# Patient Record
Sex: MALE | Race: WHITE | Employment: FULL TIME | URBAN - METROPOLITAN AREA
[De-identification: names, ages, dates, MRNs, and addresses within clinical notes are randomized per-mention and may not be internally consistent; named-entity substitution may affect disease eponyms.]

---

## 2018-11-14 NOTE — PROGRESS NOTES
[facility]  [address] [facility] [id number] [phone]  Dept: [phone]    PRE-OP EVALUATION:  Today's date: 11/15/2018    Demario Bell (: 1985) presents for pre-operative evaluation assessment as requested by Dr. Chowdhury.  He requires evaluation and anesthesia risk assessment prior to undergoing surgery/procedure for treatment of kidney stone removal .    Fax number for surgical facility:   Primary Physician: No Ref-Primary, Physician  Type of Anesthesia Anticipated: General    Patient has a Health Care Directive or Living Will:  NO    Preop Questions 11/15/2018   Who is doing your surgery? Dr Chowdhury   What are you having done? Kidney stone removal   Date of Surgery/Procedure: 2018   Facility or Hospital where procedure/surgery will be performed: [facility]   1.  Do you have a history of Heart attack, stroke, stent, coronary bypass surgery, or other heart surgery? No   2.  Do you ever have any pain or discomfort in your chest? No   3.  Do you have a history of  Heart Failure? No   4.   Are you troubled by shortness of breath when:  walking on a level surface, or up a slight hill, or at night? No   5.  Do you currently have a cold, bronchitis or other respiratory infection? YES - about 1 wk with sinus congestion, no cough or fevers.    6.  Do you have a cough, shortness of breath, or wheezing? No   7.  Do you sometimes get pains in the calves of your legs when you walk? No   8. Do you or anyone in your family have previous history of blood clots? No   9.  Do you or does anyone in your family have a serious bleeding problem such as prolonged bleeding following surgeries or cuts? No   10. Have you ever had problems with anemia or been told to take iron pills? No   11. Have you had any abnormal blood loss such as black, tarry or bloody stools? No   12. Have you ever had a blood transfusion? No   13. Have you or any of your relatives ever had problems with  anesthesia? No   14. Do you have sleep apnea, excessive snoring or daytime drowsiness? YES - uses CPAP   15. Do you have any prosthetic heart valves? No   16. Do you have prosthetic joints? No         HPI:     HPI related to upcoming procedure: history of kidney stones.       See problem list for active medical problems.  Problems all longstanding and stable, except as noted/documented.  See ROS for pertinent symptoms related to these conditions.                                                                                                                                                          .    MEDICAL HISTORY:     Patient Active Problem List    Diagnosis Date Noted     Kidney stone 11/15/2018     Priority: Medium     BRODIE (obstructive sleep apnea) 11/15/2018     Priority: Medium      Past Medical History:   Diagnosis Date     Kidney stones      Sleep apnea      Past Surgical History:   Procedure Laterality Date     AS REMOVAL OF KIDNEY STONE       No current outpatient prescriptions on file.     OTC products: None, except as noted above    No Known Allergies   Latex Allergy: NO    Social History   Substance Use Topics     Smoking status: Never Smoker     Smokeless tobacco: Never Used     Alcohol use No     History   Drug Use No       REVIEW OF SYSTEMS:   CONSTITUTIONAL: NEGATIVE for fever, chills, change in weight  INTEGUMENTARY/SKIN: NEGATIVE for worrisome rashes, moles or lesions  EYES: NEGATIVE for vision changes or irritation  ENT/MOUTH: NEGATIVE for ear, mouth and throat problems  RESP: NEGATIVE for significant cough or SOB  CV: NEGATIVE for chest pain, palpitations or peripheral edema  GI: NEGATIVE for nausea, abdominal pain, heartburn, or change in bowel habits  : NEGATIVE for frequency, dysuria, or hematuria  MUSCULOSKELETAL: NEGATIVE for significant arthralgias or myalgia  NEURO: NEGATIVE for weakness, dizziness or paresthesias  ENDOCRINE: NEGATIVE for temperature intolerance, skin/hair  "changes  HEME: NEGATIVE for bleeding problems  PSYCHIATRIC: NEGATIVE for changes in mood or affect    EXAM:   /83  Pulse 98  Temp 98.2  F (36.8  C) (Oral)  Resp 16  Ht 5' 9\" (1.753 m)  Wt 194 lb (88 kg)  SpO2 98%  BMI 28.65 kg/m2    GENERAL APPEARANCE: healthy, alert and no distress     EYES: EOMI,  PERRL     HENT: ear canals and TM's normal and nose and mouth without ulcers or lesions     NECK: no adenopathy, no asymmetry, masses, or scars and thyroid normal to palpation     RESP: lungs clear to auscultation - no rales, rhonchi or wheezes     CV: regular rates and rhythm, normal S1 S2, no S3 or S4 and no murmur, click or rub     ABDOMEN:  soft, nontender, no HSM or masses and bowel sounds normal     MS: extremities normal- no gross deformities noted, no evidence of inflammation in joints, FROM in all extremities.     SKIN: no suspicious lesions or rashes     NEURO: Normal strength and tone, sensory exam grossly normal, mentation intact and speech normal     PSYCH: mentation appears normal. and affect normal/bright     LYMPHATICS: No cervical adenopathy    DIAGNOSTICS:   Labs Resulted Today:   Results for orders placed or performed in visit on 11/15/18   Hemoglobin   Result Value Ref Range    Hemoglobin 14.7 13.3 - 17.7 g/dL       No results for input(s): HGB, PLT, INR, NA, POTASSIUM, CR, A1C in the last 47505 hours.     IMPRESSION:   Reason for surgery/procedure:  treatment of kidney stone removal .    The proposed surgical procedure is considered LOW risk.    REVISED CARDIAC RISK INDEX  The patient has the following serious cardiovascular risks for perioperative complications such as (MI, PE, VFib and 3  AV Block):  No serious cardiac risks  INTERPRETATION: 0 risks: Class I (very low risk - 0.4% complication rate)    The patient has the following additional risks for perioperative complications:  No identified additional risks      ICD-10-CM    1. Preop general physical exam Z01.818 Hemoglobin   2. " Kidney stone N20.0    3. BRODIE (obstructive sleep apnea) G47.33    4. Need for prophylactic vaccination with tetanus-diphtheria (Td) Z23        RECOMMENDATIONS:     APPROVAL GIVEN to proceed with proposed procedure, without further diagnostic evaluation       Signed Electronically by: Madhav Rose PA-C    Copy of this evaluation report is provided to requesting physician.    [place] Preop Guidelines    Revised Cardiac Risk Index  I agree with the above plan  Samuel Diaz MD

## 2018-11-15 ENCOUNTER — OFFICE VISIT (OUTPATIENT)
Dept: FAMILY MEDICINE | Facility: CLINIC | Age: 33
End: 2018-11-15
Payer: COMMERCIAL

## 2018-11-15 VITALS
OXYGEN SATURATION: 98 % | WEIGHT: 194 LBS | RESPIRATION RATE: 16 BRPM | SYSTOLIC BLOOD PRESSURE: 134 MMHG | HEART RATE: 98 BPM | BODY MASS INDEX: 28.73 KG/M2 | DIASTOLIC BLOOD PRESSURE: 83 MMHG | TEMPERATURE: 98.2 F | HEIGHT: 69 IN

## 2018-11-15 DIAGNOSIS — N20.0 KIDNEY STONE: ICD-10-CM

## 2018-11-15 DIAGNOSIS — Z01.818 PREOP GENERAL PHYSICAL EXAM: Primary | ICD-10-CM

## 2018-11-15 DIAGNOSIS — Z23 NEED FOR PROPHYLACTIC VACCINATION WITH TETANUS-DIPHTHERIA (TD): ICD-10-CM

## 2018-11-15 DIAGNOSIS — G47.33 OSA (OBSTRUCTIVE SLEEP APNEA): ICD-10-CM

## 2018-11-15 LAB — HGB BLD-MCNC: 14.7 G/DL (ref 13.3–17.7)

## 2018-11-15 PROCEDURE — 99204 OFFICE O/P NEW MOD 45 MIN: CPT | Performed by: PHYSICIAN ASSISTANT

## 2018-11-15 PROCEDURE — 85018 HEMOGLOBIN: CPT | Performed by: PHYSICIAN ASSISTANT

## 2018-11-15 PROCEDURE — 36415 COLL VENOUS BLD VENIPUNCTURE: CPT | Performed by: PHYSICIAN ASSISTANT

## 2018-11-15 NOTE — NURSING NOTE
"Chief Complaint   Patient presents with     Pre-Op Exam     Health Maintenance     PHQ2       Initial /83  Pulse 98  Temp 98.2  F (36.8  C) (Oral)  Resp 16  Ht 5' 9\" (1.753 m)  Wt 194 lb (88 kg)  SpO2 98%  BMI 28.65 kg/m2 Estimated body mass index is 28.65 kg/(m^2) as calculated from the following:    Height as of this encounter: 5' 9\" (1.753 m).    Weight as of this encounter: 194 lb (88 kg).  Medication Reconciliation: complete  Dana Gudino CMA    "

## 2018-11-15 NOTE — MR AVS SNAPSHOT
After Visit Summary   11/15/2018    Demario Bell    MRN: [id number]           Patient Information     Date Of Birth          1985        Visit Information        Provider Department      11/15/2018 7:40 AM Madhav Rose PA-C [facility]        Today's Diagnoses     Preop general physical exam    -  1    Kidney stone        BRODIE (obstructive sleep apnea)        Need for prophylactic vaccination with tetanus-diphtheria (Td)          Care Instructions      Before Your Surgery      Call your surgeon if there is any change in your health. This includes signs of a cold or flu (such as a sore throat, runny nose, cough, rash or fever).    Do not smoke, drink alcohol or take over the counter medicine (unless your surgeon or primary care doctor tells you to) for the 24 hours before and after surgery.    If you take prescribed drugs: Follow your doctor s orders about which medicines to take and which to stop until after surgery.    Eating and drinking prior to surgery: follow the instructions from your surgeon    Take a shower or bath the night before surgery. Use the soap your surgeon gave you to gently clean your skin. If you do not have soap from your surgeon, use your regular soap. Do not shave or scrub the surgery site.  Wear clean pajamas and have clean sheets on your bed.           Follow-ups after your visit        Follow-up notes from your care team     Return in about 1 year (around 11/15/2019) for As Needed.      Who to contact     If you have questions or need follow up information about today's clinic visit or your schedule please contact [facility] directly at [phone].  Normal or non-critical lab and imaging results will be communicated to you by MyChart, letter or phone within 4 business days after the clinic has received the results. If you do not hear from us within 7 days, please contact the clinic through MyChart or phone. If you have a critical  "or abnormal lab result, we will notify you by phone as soon as possible.  Submit refill requests through [employer] or call your pharmacy and they will forward the refill request to us. Please allow 3 business days for your refill to be completed.          Additional Information About Your Visit        Care EveryWhere ID     This is your Care EveryWhere ID. This could be used by other organizations to access your [facility] medical records  [id number]        Your Vitals Were     Pulse Temperature Respirations Height Pulse Oximetry BMI (Body Mass Index)    98 98.2  F (36.8  C) (Oral) 16 5' 9\" (1.753 m) 98% 28.65 kg/m2       Blood Pressure from Last 3 Encounters:   11/15/18 134/83    Weight from Last 3 Encounters:   11/15/18 194 lb (88 kg)              We Performed the Following     Hemoglobin        Primary Care Provider Fax #    Physician No Ref-Primary [phone]       No address on file        Equal Access to Services     [facility]: Hadii lewis Hinkle, waaxda luqadaha, qaybta kaalmada adedmitriyyada, belle khan . So [facility] [phone].    ATENCIÓN: Si habla español, tiene a angel disposición servicios gratuitos de asistencia lingüística. Sadie al [phone].    We comply with applicable federal civil rights laws and [place] laws. We do not discriminate on the basis of race, color, national origin, age, disability, sex, sexual orientation, or gender identity.            Thank you!     Thank you for [facility]. Our goal is always to provide you with excellent care. Hearing back from our patients is one way we can continue to improve our services. Please take a few minutes to complete the written survey that you may receive in the mail after your visit with us. Thank you!             Your Updated Medication List - Protect others around you: Learn how to safely use, store and throw away your medicines at www.disposemymeds.org.      Notice  As " of 11/15/2018  8:07 AM    You have not been prescribed any medications.

## 2021-04-17 ENCOUNTER — HEALTH MAINTENANCE LETTER (OUTPATIENT)
Age: 36
End: 2021-04-17

## 2021-09-26 ENCOUNTER — HEALTH MAINTENANCE LETTER (OUTPATIENT)
Age: 36
End: 2021-09-26

## 2022-05-08 ENCOUNTER — HEALTH MAINTENANCE LETTER (OUTPATIENT)
Age: 37
End: 2022-05-08

## 2023-04-23 ENCOUNTER — HEALTH MAINTENANCE LETTER (OUTPATIENT)
Age: 38
End: 2023-04-23

## 2023-06-02 ENCOUNTER — HEALTH MAINTENANCE LETTER (OUTPATIENT)
Age: 38
End: 2023-06-02